# Patient Record
Sex: FEMALE | Race: WHITE | NOT HISPANIC OR LATINO | Employment: UNEMPLOYED | URBAN - NONMETROPOLITAN AREA
[De-identification: names, ages, dates, MRNs, and addresses within clinical notes are randomized per-mention and may not be internally consistent; named-entity substitution may affect disease eponyms.]

---

## 2022-10-20 ENCOUNTER — OFFICE VISIT (OUTPATIENT)
Dept: OBSTETRICS AND GYNECOLOGY | Facility: CLINIC | Age: 67
End: 2022-10-20
Payer: COMMERCIAL

## 2022-10-20 VITALS
WEIGHT: 152 LBS | DIASTOLIC BLOOD PRESSURE: 88 MMHG | HEIGHT: 61 IN | SYSTOLIC BLOOD PRESSURE: 155 MMHG | BODY MASS INDEX: 28.7 KG/M2

## 2022-10-20 DIAGNOSIS — N94.10 DYSPAREUNIA IN FEMALE: ICD-10-CM

## 2022-10-20 DIAGNOSIS — Z12.4 SCREENING FOR MALIGNANT NEOPLASM OF THE CERVIX: Primary | ICD-10-CM

## 2022-10-20 DIAGNOSIS — N95.2 VAGINAL ATROPHY: ICD-10-CM

## 2022-10-20 DIAGNOSIS — Z78.0 POSTMENOPAUSAL: ICD-10-CM

## 2022-10-20 DIAGNOSIS — Z01.419 ENCOUNTER FOR ANNUAL ROUTINE GYNECOLOGICAL EXAMINATION: ICD-10-CM

## 2022-10-20 PROCEDURE — 99999 PR PBB SHADOW E&M-NEW PATIENT-LVL III: CPT | Mod: PBBFAC,,, | Performed by: OBSTETRICS & GYNECOLOGY

## 2022-10-20 PROCEDURE — 3077F PR MOST RECENT SYSTOLIC BLOOD PRESSURE >= 140 MM HG: ICD-10-PCS | Mod: CPTII,S$GLB,, | Performed by: OBSTETRICS & GYNECOLOGY

## 2022-10-20 PROCEDURE — 1159F MED LIST DOCD IN RCRD: CPT | Mod: CPTII,S$GLB,, | Performed by: OBSTETRICS & GYNECOLOGY

## 2022-10-20 PROCEDURE — 1159F PR MEDICATION LIST DOCUMENTED IN MEDICAL RECORD: ICD-10-PCS | Mod: CPTII,S$GLB,, | Performed by: OBSTETRICS & GYNECOLOGY

## 2022-10-20 PROCEDURE — 3079F DIAST BP 80-89 MM HG: CPT | Mod: CPTII,S$GLB,, | Performed by: OBSTETRICS & GYNECOLOGY

## 2022-10-20 PROCEDURE — 3077F SYST BP >= 140 MM HG: CPT | Mod: CPTII,S$GLB,, | Performed by: OBSTETRICS & GYNECOLOGY

## 2022-10-20 PROCEDURE — 3079F PR MOST RECENT DIASTOLIC BLOOD PRESSURE 80-89 MM HG: ICD-10-PCS | Mod: CPTII,S$GLB,, | Performed by: OBSTETRICS & GYNECOLOGY

## 2022-10-20 PROCEDURE — 99387 INIT PM E/M NEW PAT 65+ YRS: CPT | Mod: S$GLB,,, | Performed by: OBSTETRICS & GYNECOLOGY

## 2022-10-20 PROCEDURE — 1160F PR REVIEW ALL MEDS BY PRESCRIBER/CLIN PHARMACIST DOCUMENTED: ICD-10-PCS | Mod: CPTII,S$GLB,, | Performed by: OBSTETRICS & GYNECOLOGY

## 2022-10-20 PROCEDURE — 99999 PR PBB SHADOW E&M-NEW PATIENT-LVL III: ICD-10-PCS | Mod: PBBFAC,,, | Performed by: OBSTETRICS & GYNECOLOGY

## 2022-10-20 PROCEDURE — 99387 PR PREVENTIVE VISIT,NEW,65 & OVER: ICD-10-PCS | Mod: S$GLB,,, | Performed by: OBSTETRICS & GYNECOLOGY

## 2022-10-20 PROCEDURE — 1160F RVW MEDS BY RX/DR IN RCRD: CPT | Mod: CPTII,S$GLB,, | Performed by: OBSTETRICS & GYNECOLOGY

## 2022-10-20 RX ORDER — ESTRADIOL 0.1 MG/G
1 CREAM VAGINAL
Qty: 42.5 G | Refills: 1 | Status: SHIPPED | OUTPATIENT
Start: 2022-10-20 | End: 2023-10-20

## 2022-10-20 RX ORDER — DICYCLOMINE HYDROCHLORIDE 10 MG/1
10 CAPSULE ORAL
COMMUNITY

## 2022-10-20 RX ORDER — AMLODIPINE BESYLATE 10 MG/1
TABLET ORAL DAILY
COMMUNITY

## 2022-10-20 RX ORDER — ASPIRIN 81 MG/1
81 TABLET ORAL DAILY
COMMUNITY

## 2022-10-20 RX ORDER — ROSUVASTATIN CALCIUM 10 MG/1
10 TABLET, COATED ORAL DAILY
COMMUNITY

## 2022-10-20 NOTE — PROGRESS NOTES
Subjective:    Patient ID: Jaqueline Self is a 67 y.o. female.     Chief Complaint: Annual Well Woman Exam     History of Present Illness:  Jaqueline presents today for Annual Well Woman exam. .No LMP recorded. Patient is postmenopausal.. She is currently using no hormone therapy and she reports problems with Painful intercourse. She denies breast tenderness, denies masses, denies nipple discharge. She denies difficulty with urination. Bowel movements have not significantly changed.       Menstrual History:   No LMP recorded. Patient is postmenopausal..     OB History    No obstetric history on file.         Review of Systems      Objective:    Vital Signs:  Vitals:    10/20/22 1110   BP: (!) 155/88       Physical Exam:  General:  alert, no distress   Skin:  Skin color, texture, turgor normal. No rashes or lesions   HEENT:  conjunctivae/corneas clear. PERRL.   Neck: supple, trachea midline, no adenopathy or thyromegaly   Respiratory:  clear to auscultation bilaterally   Heart:  regular rate and rhythm, S1, S2 normal, no murmur, click, rub or gallop   Breasts: Left Breast: Nipple protruding. No nipple discharge. No palpable masses, erythema, skin changes, tenderness, or adenopathy.  Right Breast: Nipple protruding. No nipple discharge. No palpable masses, erythema, skin changes, tenderness, or adenopathy.   Abdomen:  Soft, non-tender. Bowel sounds normal. No masses,  no organomegaly   Pelvis: External genitalia: normal general appearance, granuloma annulare to right thigh  Urinary system: urethral meatus normal, bladder nontender  Vaginal: atrophic mucosa  Cervix: normal appearance, thin prep PAP obtained  Uterus: normal single, nontender  Adnexa: normal bimanual exam   Extremities: Normal ROM; no edema, no cyanosis   Neurological: Normal strength and tone. No focal numbness or weakness. Reflexes 2+ and equal.   Psychiatric: normal mood, speech, dress, and thought processes     Discussed vaginal atrophy and need for  vaginal estrogen to help with mucosal integrity.  This in turn should help with painful intercourse.  Also recommended use of lubricants during sex.  All questions  answered        Assessment:      1. Screening for malignant neoplasm of the cervix    2. Encounter for annual routine gynecological examination    3. Postmenopausal    4. Dyspareunia in female    5. Vaginal atrophy          Plan:      Screening for malignant neoplasm of the cervix  -     Liquid-Based Pap Smear, Screening    Encounter for annual routine gynecological examination    Postmenopausal    Dyspareunia in female    Vaginal atrophy    Other orders  -     estradioL (ESTRACE) 0.01 % (0.1 mg/gram) vaginal cream; Place 1 g vaginally twice a week. Use 1 gram nightly for the first 2 weeks  Dispense: 42.5 g; Refill: 1        Health Maintenance and Screening:   Jaqueline was counseled on A.C.O.G. Pap guidelines and recommendations for yearly pelvic exams in addition to recommendations for yearly mammograms and monthly self breast exams, and adequate calcium and vitamin D in her diet.     A discussion of needed Health Maintenance Screening was done with Jaqueline. She was counseled that she is overdue for Mammogram: She will schedule: later.    Radha Andre MD FACOG    10/20/2022  11:47 AM

## 2022-10-30 LAB
HPV16+18+H RISK 12 DNA CVX-IMP: NORMAL
LIQUID BASED PAP SMEAR, SCREENING: NORMAL